# Patient Record
Sex: MALE
[De-identification: names, ages, dates, MRNs, and addresses within clinical notes are randomized per-mention and may not be internally consistent; named-entity substitution may affect disease eponyms.]

---

## 2019-12-23 ENCOUNTER — APPOINTMENT (OUTPATIENT)
Dept: WOUND CARE | Facility: CLINIC | Age: 64
End: 2019-12-23
Payer: MEDICARE

## 2019-12-23 VITALS — SYSTOLIC BLOOD PRESSURE: 165 MMHG | TEMPERATURE: 98.1 F | DIASTOLIC BLOOD PRESSURE: 81 MMHG | HEART RATE: 86 BPM

## 2019-12-23 PROBLEM — Z00.00 ENCOUNTER FOR PREVENTIVE HEALTH EXAMINATION: Status: ACTIVE | Noted: 2019-12-23

## 2019-12-23 PROCEDURE — 11042 DBRDMT SUBQ TIS 1ST 20SQCM/<: CPT

## 2019-12-23 PROCEDURE — 29445 APPL RIGID TOT CNTC LEG CAST: CPT | Mod: 58,LT

## 2019-12-23 NOTE — PHYSICAL EXAM
[4 x 4] : 4 x 4  [de-identified] : L foot sub met 3-4 wound to subq w/ hyperkeratotic periwound, stable w/ no acute signs of infection  [FreeTextEntry1] : sub met 3-4  [de-identified] : Hyperkeratotic  [FreeTextEntry5] : 10 blade  [de-identified] : Aquacel / VIDAL [TWNoteComboBox1] : Left [TWNoteComboBox4] : None [TWNoteComboBox6] : Pressure [de-identified] : No [de-identified] : other [de-identified] : None [de-identified] : >75% [de-identified] : No [TWNoteComboBox7] : Milagros

## 2019-12-23 NOTE — HISTORY OF PRESENT ILLNESS
[FreeTextEntry1] : 63 y/o M presents to clinic referred from Dr[facility] d/t a non healing ulcer to the plantar aspect L foot. Per pt, the wound was caused initially by removal of a callus. Wound has been present for approximately 6 weeks and during that time has finished a short course of PO Abx. Pt reports performing dressing changes 2x/day and wears a forefoot relief shoe daily. PT denies N/V/F/C/SOB or further pedal complaints

## 2019-12-23 NOTE — ASSESSMENT
[FreeTextEntry1] : 65 y/o M w/ L foot sub met 3-4 wound to subq\par - pt seen and evaluated\par - L foot wound w/ chronic granular base and hyperkeratotic borders, stable w/ no acute signs of infection\par - aseptic wound debridement performed w/ sterile 10 blade down to subq and not beyond sub q\par - applied Aquacel and TCC\par - Dispensed cast shoe w/ 1/2" heel lift\par - RTC: 1 week reapplication of TCC\par

## 2019-12-30 ENCOUNTER — APPOINTMENT (OUTPATIENT)
Dept: WOUND CARE | Facility: CLINIC | Age: 64
End: 2019-12-30
Payer: MEDICARE

## 2019-12-30 PROCEDURE — 11042 DBRDMT SUBQ TIS 1ST 20SQCM/<: CPT

## 2019-12-30 RX ORDER — COLLAGENASE SANTYL 250 [ARB'U]/G
250 OINTMENT TOPICAL DAILY
Qty: 1 | Refills: 3 | Status: ACTIVE | OUTPATIENT
Start: 2019-12-30

## 2020-01-03 NOTE — HISTORY OF PRESENT ILLNESS
[FreeTextEntry1] : 63 y/o M presents to clinic referred from Dr[facility] d/t a non healing ulcer to the plantar aspect L foot. Per pt, the wound was caused initially by removal of a callus. Wound has been present for approximately 6 weeks and during that time has finished a short course of PO Abx. Per pt, he showered Saturday and TCC got wet. Pt also reports the front of TCC broke down. PT denies N/V/F/C/SOB or further pedal complaints

## 2020-01-03 NOTE — ASSESSMENT
[FreeTextEntry1] : 65 y/o M w/ L foot sub met 3-4 wound to subq\par - pt seen and evaluated\par - TCC broken down to distal forefoot w/ foot exposed\par - Pt reports getting cast wet while showering on Saturday\par - TCC removed\par - maceration to plantar foot w/ moderate malodor\par - L foot wound w/ chronic granular base and macerated borders, stable w/ no acute signs of infection\par - aseptic wound debridement performed w/ sterile 10 blade down to subq and not beyond sub q\par - applied Aquacel and placed int a pressure relief CAM boot\par - cont to use silvadene cream, until Santyl delivered\par - Rx: Santyl\par - Daily dressing changes daily w/ Santyl \par - RTC: 2 weeks \par

## 2020-01-03 NOTE — PHYSICAL EXAM
[4 x 4] : 4 x 4  [de-identified] : L foot sub met 3-4 wound to subq w/ hyperkeratotic periwound, s macerated plantar foot and sinai wound 2/2 to getting TCC wet  [FreeTextEntry1] : sub met 3-4  [FreeTextEntry2] : 2.0 cm  [FreeTextEntry3] : 1.5 cm  [FreeTextEntry4] : 0.5 cm [de-identified] : Hyperkeratotic  [FreeTextEntry5] : 10 blade  [de-identified] : Santyl  [TWNoteComboBox1] : Left [TWNoteComboBox4] : None [TWNoteComboBox6] : Pressure [de-identified] : No [de-identified] : other [de-identified] : None [de-identified] : >75% [de-identified] : No [TWNoteComboBox7] : Milagros

## 2020-01-13 ENCOUNTER — APPOINTMENT (OUTPATIENT)
Dept: WOUND CARE | Facility: CLINIC | Age: 65
End: 2020-01-13
Payer: MEDICARE

## 2020-01-13 PROCEDURE — 11042 DBRDMT SUBQ TIS 1ST 20SQCM/<: CPT

## 2020-01-13 RX ORDER — COLLAGENASE SANTYL 250 [ARB'U]/G
250 OINTMENT TOPICAL DAILY
Qty: 1 | Refills: 0 | Status: ACTIVE | COMMUNITY
Start: 2020-01-13 | End: 1900-01-01

## 2020-01-13 NOTE — PHYSICAL EXAM
[4 x 4] : 4 x 4  [FreeTextEntry2] : 2.0 cm  [FreeTextEntry1] : sub met 3-4  [de-identified] : L foot sub met 3-4 wound to subq w/ hyperkeratotic periwound, s macerated plantar foot and sinai wound 2/2 to getting TCC wet  [FreeTextEntry4] : 0.3 cm [de-identified] : Hyperkeratotic  [FreeTextEntry3] : 1.4 cm  [FreeTextEntry5] : 10 blade  [TWNoteComboBox1] : Left [de-identified] : Santyl  [TWNoteComboBox6] : Pressure [de-identified] : other [de-identified] : No [TWNoteComboBox4] : None [de-identified] : No [de-identified] : None [de-identified] : >75% [TWNoteComboBox7] : Milagros

## 2020-01-13 NOTE — HISTORY OF PRESENT ILLNESS
[FreeTextEntry1] : 63 y/o M presents to clinic referred from Dr. [facility] d/t a non healing ulcer to the plantar aspect L foot. Per pt, the wound was caused initially by removal of a callus. Pt has been wearing CAM boot daily and ambulates w/ cam boot. States that he put cam boot in washing machine and needs a new CAM boot. Never got santyl. PT denies N/V/F/C/SOB or further pedal complaints

## 2020-01-13 NOTE — ASSESSMENT
[FreeTextEntry1] : 65 y/o M w/ L foot sub met 3-4 wound to subq\par - pt seen and evaluated\par - TCC broken down to distal forefoot w/ foot exposed\par - Pt reports getting cast wet while showering on Saturday\par - PT to get new CAM boot and knee scooter\par - maceration to plantar foot w/ moderate malodor\par - L foot wound w/ chronic granular base and macerated borders, stable w/ no acute signs of infection\par - aseptic wound debridement performed w/ sterile 10 blade down to subq and not beyond sub q\par - cont to use silvadene cream, until Santyl delivered\par - Rx: Santyl\par - if wound does not improve will apply TTC next visit \par - Daily dressing changes daily w/ Santyl \par - to get new boot from [facility]\par - RTC: 2 weeks \par

## 2020-01-27 ENCOUNTER — TRANSCRIPTION ENCOUNTER (OUTPATIENT)
Age: 65
End: 2020-01-27

## 2020-01-27 ENCOUNTER — APPOINTMENT (OUTPATIENT)
Dept: WOUND CARE | Facility: CLINIC | Age: 65
End: 2020-01-27
Payer: MEDICARE

## 2020-01-27 VITALS — HEART RATE: 108 BPM | SYSTOLIC BLOOD PRESSURE: 162 MMHG | TEMPERATURE: 98.6 F | DIASTOLIC BLOOD PRESSURE: 88 MMHG

## 2020-01-27 PROCEDURE — 11042 DBRDMT SUBQ TIS 1ST 20SQCM/<: CPT

## 2020-01-27 RX ORDER — COLLAGENASE SANTYL 250 [ARB'U]/G
250 OINTMENT TOPICAL DAILY
Qty: 1 | Refills: 2 | Status: ACTIVE | COMMUNITY
Start: 2020-01-27 | End: 1900-01-01

## 2020-01-27 NOTE — ASSESSMENT
[FreeTextEntry1] : 65 y/o M w/ L foot sub met 3-4 wound to subq\par - pt seen and evaluated\par - PT to get new CAM boot and knee scooter --> patient has been using it \par - maceration to plantar foot w/ moderate malodor\par - L foot wound w/ chronic granular base and macerated borders, stable w/ no acute signs of infection\par - aseptic wound debridement performed w/ sterile 10 blade down to subq and not beyond sub q\par - cont to use silvadene cream, until Santyl delivered\par - Rx: Santyl again to personal pharmacy. \par - if wound does not improve will apply TTC vs. pTAL; d/w patient at length the risks and benefits of pTAL and its importance to relieve forefoot pressure. All questions answered to patient's understanding. \par - Daily dressing changes daily w/ Santyl \par - RTC: 2 weeks \par

## 2020-01-27 NOTE — HISTORY OF PRESENT ILLNESS
[FreeTextEntry1] : 63 y/o M presents to clinic referred from Dr[facility] d/t a non healing ulcer to the plantar aspect L foot. Per pt, the wound was caused initially by removal of a callus. Pt has been wearing CAM boot daily and ambulates w/ cam boot. States that he put cam boot in washing machine and needs a new CAM boot. Never got veronica and has been using silvadene instead. Verified Rx were sent, but last one states failed transmission. PT denies N/V/F/C/SOB or further pedal complaints

## 2020-01-27 NOTE — PHYSICAL EXAM
[4 x 4] : 4 x 4  [de-identified] : L foot sub met 3-4 wound to subq w/ hyperkeratotic periwound, granular wound bed with no acute signs of infection  [FreeTextEntry1] : sub met 3-4  [FreeTextEntry2] : 2.0 cm  [FreeTextEntry3] : 1.4 cm  [FreeTextEntry4] : 0.3 cm [de-identified] : Hyperkeratotic  [FreeTextEntry5] : 10 blade  [de-identified] : Santyl  [TWNoteComboBox1] : Left [TWNoteComboBox4] : None [TWNoteComboBox6] : Pressure [de-identified] : No [de-identified] : other [de-identified] : None [de-identified] : >75% [de-identified] : No [TWNoteComboBox7] : Milagros

## 2020-02-10 ENCOUNTER — APPOINTMENT (OUTPATIENT)
Dept: WOUND CARE | Facility: CLINIC | Age: 65
End: 2020-02-10
Payer: MEDICARE

## 2020-02-10 PROCEDURE — 11042 DBRDMT SUBQ TIS 1ST 20SQCM/<: CPT

## 2020-02-10 NOTE — ASSESSMENT
[FreeTextEntry1] : 63 y/o M w/ L foot sub met 3-4 wound to subq\par - pt seen and evaluated\par - PT to continue using knee scooter at all times\par - maceration to plantar foot, with large surrounding callus\par - L foot wound w/ chronic granular base and macerated borders, stable w/ no acute signs of infection\par - aseptic wound debridement performed w/ sterile 15 blade down to subq and not beyond sub q\par - cont to use silvadene cream, until Santyl delivered\par - if wound does not improve will apply pTAL; d/w patient at length the risks and benefits of pTAL and its importance to relieve forefoot pressure. All questions answered to patient's understanding. \par - Pt is agreeable for [facility], will get insurance authorization for in office [facility], hopefully will perform when he returns in 2 weeks\par - Spoke with S[facility] about getting a tall CAM boot so he can WB after procedure\par - RTC: 2 weeks \par

## 2020-02-10 NOTE — PHYSICAL EXAM
[0] : left 0 [4 x 4] : 4 x 4  [de-identified] : L foot sub met 3-4 wound to subq w/ hyperkeratotic periwound, granular wound bed with no acute signs of infection  [FreeTextEntry1] : sub met 3-4  [FreeTextEntry2] : 2.4 cm  [FreeTextEntry3] : 1.9 cm  [FreeTextEntry4] : 0.5 cm [de-identified] : Hyperkeratotic  [FreeTextEntry5] : 15 blade  [de-identified] : silvadene [TWNoteComboBox1] : Left [TWNoteComboBox4] : None [TWNoteComboBox6] : Pressure [de-identified] : No [de-identified] : other [de-identified] : None [de-identified] : >75% [de-identified] : No [TWNoteComboBox7] : Milagros

## 2020-02-10 NOTE — HISTORY OF PRESENT ILLNESS
[FreeTextEntry1] : 65 y/o M presents to clinic referred from Dr[facility] d/t a non healing ulcer to the plantar aspect L foot. Per pt, the wound was caused initially by removal of a callus. Pt has been wearing CAM boot daily and ambulates w/ cam boot. States that he put cam boot in washing machine and needs a new CAM boot. Never got veronica and has been using silvadene instead. Verified Rx were sent, but last one states failed transmission. Pt denies N/V/F/C/SOB or further pedal complaints

## 2020-02-24 ENCOUNTER — APPOINTMENT (OUTPATIENT)
Dept: WOUND CARE | Facility: CLINIC | Age: 65
End: 2020-02-24
Payer: MEDICARE

## 2020-02-24 VITALS
HEIGHT: 68 IN | TEMPERATURE: 98 F | WEIGHT: 285 LBS | DIASTOLIC BLOOD PRESSURE: 94 MMHG | BODY MASS INDEX: 43.19 KG/M2 | HEART RATE: 86 BPM | SYSTOLIC BLOOD PRESSURE: 181 MMHG

## 2020-02-24 DIAGNOSIS — E11.42 TYPE 2 DIABETES MELLITUS WITH DIABETIC POLYNEUROPATHY: ICD-10-CM

## 2020-02-24 PROCEDURE — 99213 OFFICE O/P EST LOW 20 MIN: CPT

## 2020-02-25 NOTE — PHYSICAL EXAM
[0] : left 0 [4 x 4] : 4 x 4  [de-identified] : L foot sub met 3-4 wound to subq w/ hyperkeratotic periwound, granular wound bed with no acute signs of infection  [FreeTextEntry2] : 1.3 cm  [FreeTextEntry1] : sub met 3-4  [FreeTextEntry3] : 2.5 cm  [FreeTextEntry4] : 0.5 cm [de-identified] : Hyperkeratotic  [FreeTextEntry5] : 15 blade  [de-identified] : silvadene [TWNoteComboBox1] : Left [TWNoteComboBox4] : None [de-identified] : No [TWNoteComboBox6] : Pressure [de-identified] : None [de-identified] : other [de-identified] : >75% [de-identified] : No [TWNoteComboBox7] : Milagros [de-identified] : 2x Daily

## 2020-02-25 NOTE — ASSESSMENT
[FreeTextEntry1] : 63 y/o M w/ L foot sub met 3-4 wound to subq\par - pt seen and evaluated\par - Pt to continue using knee scooter at all times\par - maceration to plantar foot, with surrounding callus, callus improving, pt has been limiting weight bearing\par - L foot wound w/ chronic granular base \par - aseptic wound debridement performed w/ sterile 15 blade down to subq and not beyond sub q\par - cont to use silvadene cream, until Santyl delivered\par - Pt is agreeable for [facility], will get insurance authorization for in office [facility], plan to perform at next viist\par - tall CAM boot dispensed today, should wear at all times while ambulating\par - Limti activity level as much as possible\par - RTC 1 week\par

## 2020-03-09 ENCOUNTER — APPOINTMENT (OUTPATIENT)
Dept: WOUND CARE | Facility: CLINIC | Age: 65
End: 2020-03-09
Payer: MEDICARE

## 2020-03-09 VITALS — TEMPERATURE: 98.6 F

## 2020-03-09 PROCEDURE — 27605 INCISION OF ACHILLES TENDON: CPT | Mod: LT

## 2020-03-13 NOTE — HISTORY OF PRESENT ILLNESS
[FreeTextEntry1] : 65 y/o M presents to clinic referred from Dr. [facility] d/t a non healing ulcer to the plantar aspect L foot. Pt has been wearing CAM boot daily and ambulates w/ cam boot. Has been applying silvadene to L foot wound daily. Pt denies N/V/F/C/SOB or further pedal complaints

## 2020-03-13 NOTE — PHYSICAL EXAM
[0] : left 0 [4 x 4] : 4 x 4  [de-identified] : L foot sub met 3-4 wound to subq w/ hyperkeratotic periwound, granular wound bed with no acute signs of infection  [FreeTextEntry1] : sub met 3-4  [FreeTextEntry2] : 1.0 cm  [FreeTextEntry3] : 1.4 cm  [FreeTextEntry4] : 0.5 cm [de-identified] : Hyperkeratotic  [FreeTextEntry5] : 15 blade  [de-identified] : KEVYN [TWNoteComboBox1] : Left [TWNoteComboBox4] : None [TWNoteComboBox6] : Pressure [de-identified] : No [de-identified] : other [de-identified] : None [de-identified] : >75% [de-identified] : No [TWNoteComboBox7] : Milagros [de-identified] : False

## 2020-03-13 NOTE — ASSESSMENT
[FreeTextEntry1] : 65 y/o M w/ L foot sub met 3-4 wound to subq, now s/p L PTAL (3/9/20)\par - pt seen and evaluated\par - maceration to plantar foot, with surrounding callus, callus improving, pt has tries limit weight bearing\par - aseptic wound debridement performed w/ sterile 15 blade down to subq and not beyond sub q\par - local block of the posterior aspect of the right ankle with 10 cc 1% lidocaine plain\par - Betadine prep\par - Using sterile technique Percutaneous [facility] preformed using 15 blade and closed w/ 4-nylon simple sutures, dry sterile dressing applied. \par - Adequate L ankle dorsiflexion achieved post PTAL\par - redressed with DSD\par - will determine treatment regime in 1 week \par - Continue ambulation in CAM boot\par - Limit activity level as much as possible\par - RTC 1 week\par

## 2020-03-16 ENCOUNTER — APPOINTMENT (OUTPATIENT)
Dept: WOUND CARE | Facility: CLINIC | Age: 65
End: 2020-03-16
Payer: MEDICARE

## 2020-03-16 VITALS
HEIGHT: 68 IN | HEART RATE: 102 BPM | TEMPERATURE: 98.2 F | BODY MASS INDEX: 43.19 KG/M2 | SYSTOLIC BLOOD PRESSURE: 164 MMHG | WEIGHT: 285 LBS | DIASTOLIC BLOOD PRESSURE: 92 MMHG

## 2020-03-16 DIAGNOSIS — M24.572 CONTRACTURE, LEFT ANKLE: ICD-10-CM

## 2020-03-16 DIAGNOSIS — L97.522 NON-PRESSURE CHRONIC ULCER OF OTHER PART OF LEFT FOOT WITH FAT LAYER EXPOSED: ICD-10-CM

## 2020-03-16 DIAGNOSIS — E11.40 TYPE 2 DIABETES MELLITUS WITH DIABETIC NEUROPATHY, UNSPECIFIED: ICD-10-CM

## 2020-03-16 PROCEDURE — 11042 DBRDMT SUBQ TIS 1ST 20SQCM/<: CPT | Mod: 79

## 2020-03-30 ENCOUNTER — APPOINTMENT (OUTPATIENT)
Dept: WOUND CARE | Facility: CLINIC | Age: 65
End: 2020-03-30

## 2020-04-16 NOTE — HISTORY OF PRESENT ILLNESS
[FreeTextEntry1] : 65 y/o M presents to clinic referred from Dr. [facility] clinic d/t a non healing ulcer to the plantar aspect L foot. Pt has been wearing CAM boot daily and ambulates w/ cam boot. Pt has kept dressing clean dry and intact. During last visit had [facility] done.  Pt denies N/V/F/C/SOB or further pedal complaints

## 2020-04-16 NOTE — PHYSICAL EXAM
[0] : left 0 [4 x 4] : 4 x 4  [de-identified] : L foot sub met 3-4 wound to subq w/ hyperkeratotic periwound, granular wound bed with no acute signs of infection  [FreeTextEntry1] : sub met 3-4  [FreeTextEntry2] : 0.9 cm  [FreeTextEntry3] : 0.4 cm  [FreeTextEntry4] : 0.3 cm [de-identified] : Hyperkeratotic  [FreeTextEntry5] : 15 blade  [de-identified] : KEVYN [TWNoteComboBox1] : Left [TWNoteComboBox4] : None [TWNoteComboBox6] : Pressure [de-identified] : No [de-identified] : other [de-identified] : None [de-identified] : >75% [de-identified] : No [TWNoteComboBox7] : Milagros

## 2020-04-16 NOTE — ASSESSMENT
[FreeTextEntry1] : 65 y/o M w/ L foot sub met 3-4 wound to subq, now s/p L PTAL (3/9/20)\par - pt seen and evaluated\par -[facility] site performed last week - sutures intact, well coapted\par -submet 3-4 wound excisional debridement with curette down to subq and not beyond sub q\par - change dressing daily with silvadene + DSD \par - Continue ambulation in CAM boot\par - Limit activity level as much as possible\par - RTC 2 weeks for suture removal\par

## 2020-06-18 PROBLEM — L97.522 SKIN ULCER OF LEFT FOOT WITH FAT LAYER EXPOSED: Status: ACTIVE | Noted: 2019-12-23
